# Patient Record
Sex: MALE | Race: WHITE | NOT HISPANIC OR LATINO | Employment: OTHER | URBAN - METROPOLITAN AREA
[De-identification: names, ages, dates, MRNs, and addresses within clinical notes are randomized per-mention and may not be internally consistent; named-entity substitution may affect disease eponyms.]

---

## 2021-04-26 ENCOUNTER — OFFICE VISIT (OUTPATIENT)
Dept: ORTHOPEDICS | Facility: CLINIC | Age: 61
End: 2021-04-26
Payer: COMMERCIAL

## 2021-04-26 ENCOUNTER — TELEPHONE (OUTPATIENT)
Dept: ORTHOPEDICS | Facility: CLINIC | Age: 61
End: 2021-04-26

## 2021-04-26 ENCOUNTER — HOSPITAL ENCOUNTER (OUTPATIENT)
Dept: RADIOLOGY | Facility: HOSPITAL | Age: 61
Discharge: HOME | End: 2021-04-26
Attending: ORTHOPAEDIC SURGERY
Payer: COMMERCIAL

## 2021-04-26 VITALS
WEIGHT: 184.06 LBS | DIASTOLIC BLOOD PRESSURE: 69 MMHG | HEART RATE: 75 BPM | HEIGHT: 65 IN | SYSTOLIC BLOOD PRESSURE: 140 MMHG | BODY MASS INDEX: 30.67 KG/M2

## 2021-04-26 DIAGNOSIS — M25.571 ACUTE RIGHT ANKLE PAIN: Primary | ICD-10-CM

## 2021-04-26 DIAGNOSIS — S82.831A TRAUMATIC CLOSED NONDISPLACED FRACTURE OF DISTAL FIBULA, RIGHT, INITIAL ENCOUNTER: Primary | ICD-10-CM

## 2021-04-26 DIAGNOSIS — M25.571 ACUTE RIGHT ANKLE PAIN: ICD-10-CM

## 2021-04-26 PROCEDURE — 27786 TREATMENT OF ANKLE FRACTURE: CPT | Mod: RT,S$GLB,, | Performed by: ORTHOPAEDIC SURGERY

## 2021-04-26 PROCEDURE — 3008F PR BODY MASS INDEX (BMI) DOCUMENTED: ICD-10-PCS | Mod: CPTII,S$GLB,, | Performed by: ORTHOPAEDIC SURGERY

## 2021-04-26 PROCEDURE — 99999 PR PBB SHADOW E&M-NEW PATIENT-LVL III: CPT | Mod: PBBFAC,,, | Performed by: ORTHOPAEDIC SURGERY

## 2021-04-26 PROCEDURE — 73610 X-RAY EXAM OF ANKLE: CPT | Mod: TC,PO,RT

## 2021-04-26 PROCEDURE — 1125F AMNT PAIN NOTED PAIN PRSNT: CPT | Mod: S$GLB,,, | Performed by: ORTHOPAEDIC SURGERY

## 2021-04-26 PROCEDURE — 73610 X-RAY EXAM OF ANKLE: CPT | Mod: 26,RT,, | Performed by: RADIOLOGY

## 2021-04-26 PROCEDURE — 1125F PR PAIN SEVERITY QUANTIFIED, PAIN PRESENT: ICD-10-PCS | Mod: S$GLB,,, | Performed by: ORTHOPAEDIC SURGERY

## 2021-04-26 PROCEDURE — 27786 PR CLOSED RX DIST FIBULA FX: ICD-10-PCS | Mod: RT,S$GLB,, | Performed by: ORTHOPAEDIC SURGERY

## 2021-04-26 PROCEDURE — 99999 PR PBB SHADOW E&M-NEW PATIENT-LVL III: ICD-10-PCS | Mod: PBBFAC,,, | Performed by: ORTHOPAEDIC SURGERY

## 2021-04-26 PROCEDURE — 99204 OFFICE O/P NEW MOD 45 MIN: CPT | Mod: 57,S$GLB,, | Performed by: ORTHOPAEDIC SURGERY

## 2021-04-26 PROCEDURE — 3008F BODY MASS INDEX DOCD: CPT | Mod: CPTII,S$GLB,, | Performed by: ORTHOPAEDIC SURGERY

## 2021-04-26 PROCEDURE — 99204 PR OFFICE/OUTPT VISIT, NEW, LEVL IV, 45-59 MIN: ICD-10-PCS | Mod: 57,S$GLB,, | Performed by: ORTHOPAEDIC SURGERY

## 2021-04-26 PROCEDURE — 73610 XR ANKLE COMPLETE 3 VIEW RIGHT: ICD-10-PCS | Mod: 26,RT,, | Performed by: RADIOLOGY

## 2021-04-26 RX ORDER — OXCARBAZEPINE 300 MG/1
300 TABLET, FILM COATED ORAL 2 TIMES DAILY
COMMUNITY
Start: 2021-03-23

## 2021-04-26 RX ORDER — TIZANIDINE 4 MG/1
4 TABLET ORAL DAILY
COMMUNITY
Start: 2021-04-07

## 2021-04-26 RX ORDER — LISINOPRIL 20 MG/1
20 TABLET ORAL 2 TIMES DAILY
COMMUNITY
Start: 2021-03-23

## 2021-04-26 RX ORDER — MELOXICAM 15 MG/1
15 TABLET ORAL DAILY
COMMUNITY
Start: 2021-03-23

## 2021-04-26 RX ORDER — ATORVASTATIN CALCIUM 10 MG/1
10 TABLET, FILM COATED ORAL DAILY
COMMUNITY
Start: 2021-03-23

## 2021-04-26 RX ORDER — GABAPENTIN 300 MG/1
CAPSULE ORAL
COMMUNITY
Start: 2021-04-07

## 2021-04-26 RX ORDER — CHLORZOXAZONE 500 MG/1
500 TABLET ORAL 3 TIMES DAILY
COMMUNITY
Start: 2021-03-23

## 2021-04-28 PROBLEM — S82.831A TRAUMATIC CLOSED NONDISPLACED FRACTURE OF DISTAL FIBULA, RIGHT, INITIAL ENCOUNTER: Status: ACTIVE | Noted: 2021-04-28

## 2021-04-29 ENCOUNTER — TELEPHONE (OUTPATIENT)
Dept: ORTHOPEDICS | Facility: CLINIC | Age: 61
End: 2021-04-29

## 2021-04-29 DIAGNOSIS — M25.571 ACUTE RIGHT ANKLE PAIN: Primary | ICD-10-CM

## 2021-04-29 RX ORDER — HYDROCODONE BITARTRATE AND ACETAMINOPHEN 7.5; 325 MG/1; MG/1
1 TABLET ORAL EVERY 6 HOURS PRN
Qty: 24 TABLET | Refills: 0 | Status: SHIPPED | OUTPATIENT
Start: 2021-04-29

## 2021-05-06 DIAGNOSIS — S82.831A TRAUMATIC CLOSED NONDISPLACED FRACTURE OF DISTAL FIBULA, RIGHT, INITIAL ENCOUNTER: ICD-10-CM

## 2021-05-06 DIAGNOSIS — M25.571 ACUTE RIGHT ANKLE PAIN: Primary | ICD-10-CM

## 2021-05-06 DIAGNOSIS — S82.831D TRAUMATIC CLOSED NONDISPLACED FRACTURE OF DISTAL FIBULA, RIGHT, WITH ROUTINE HEALING, SUBSEQUENT ENCOUNTER: ICD-10-CM

## 2021-05-11 ENCOUNTER — HOSPITAL ENCOUNTER (OUTPATIENT)
Dept: RADIOLOGY | Facility: HOSPITAL | Age: 61
Discharge: HOME | End: 2021-05-11
Attending: ORTHOPAEDIC SURGERY
Payer: COMMERCIAL

## 2021-05-11 ENCOUNTER — OFFICE VISIT (OUTPATIENT)
Dept: ORTHOPEDICS | Facility: CLINIC | Age: 61
End: 2021-05-11
Payer: COMMERCIAL

## 2021-05-11 VITALS
BODY MASS INDEX: 30.67 KG/M2 | DIASTOLIC BLOOD PRESSURE: 72 MMHG | HEIGHT: 65 IN | WEIGHT: 184.06 LBS | HEART RATE: 70 BPM | SYSTOLIC BLOOD PRESSURE: 127 MMHG

## 2021-05-11 DIAGNOSIS — S82.831D TRAUMATIC CLOSED NONDISPLACED FRACTURE OF DISTAL FIBULA, RIGHT, WITH ROUTINE HEALING, SUBSEQUENT ENCOUNTER: ICD-10-CM

## 2021-05-11 DIAGNOSIS — S82.831D TRAUMATIC CLOSED NONDISPLACED FRACTURE OF DISTAL FIBULA, RIGHT, WITH ROUTINE HEALING, SUBSEQUENT ENCOUNTER: Primary | ICD-10-CM

## 2021-05-11 PROCEDURE — 73610 XR ANKLE COMPLETE 3 VIEW RIGHT: ICD-10-PCS | Mod: 26,RT,, | Performed by: RADIOLOGY

## 2021-05-11 PROCEDURE — 1125F PR PAIN SEVERITY QUANTIFIED, PAIN PRESENT: ICD-10-PCS | Mod: S$GLB,,, | Performed by: ORTHOPAEDIC SURGERY

## 2021-05-11 PROCEDURE — 3008F BODY MASS INDEX DOCD: CPT | Mod: CPTII,S$GLB,, | Performed by: ORTHOPAEDIC SURGERY

## 2021-05-11 PROCEDURE — 99024 PR POST-OP FOLLOW-UP VISIT: ICD-10-PCS | Mod: S$GLB,,, | Performed by: ORTHOPAEDIC SURGERY

## 2021-05-11 PROCEDURE — 99024 POSTOP FOLLOW-UP VISIT: CPT | Mod: S$GLB,,, | Performed by: ORTHOPAEDIC SURGERY

## 2021-05-11 PROCEDURE — 73610 X-RAY EXAM OF ANKLE: CPT | Mod: 26,RT,, | Performed by: RADIOLOGY

## 2021-05-11 PROCEDURE — 73610 X-RAY EXAM OF ANKLE: CPT | Mod: TC,PO,RT

## 2021-05-11 PROCEDURE — 99999 PR PBB SHADOW E&M-EST. PATIENT-LVL III: CPT | Mod: PBBFAC,,, | Performed by: ORTHOPAEDIC SURGERY

## 2021-05-11 PROCEDURE — 3008F PR BODY MASS INDEX (BMI) DOCUMENTED: ICD-10-PCS | Mod: CPTII,S$GLB,, | Performed by: ORTHOPAEDIC SURGERY

## 2021-05-11 PROCEDURE — 99999 PR PBB SHADOW E&M-EST. PATIENT-LVL III: ICD-10-PCS | Mod: PBBFAC,,, | Performed by: ORTHOPAEDIC SURGERY

## 2021-05-11 PROCEDURE — 1125F AMNT PAIN NOTED PAIN PRSNT: CPT | Mod: S$GLB,,, | Performed by: ORTHOPAEDIC SURGERY

## 2021-05-26 DIAGNOSIS — S82.831D TRAUMATIC CLOSED NONDISPLACED FRACTURE OF DISTAL FIBULA, RIGHT, WITH ROUTINE HEALING, SUBSEQUENT ENCOUNTER: Primary | ICD-10-CM

## 2021-06-01 ENCOUNTER — HOSPITAL ENCOUNTER (OUTPATIENT)
Dept: RADIOLOGY | Facility: HOSPITAL | Age: 61
Discharge: HOME | End: 2021-06-01
Attending: ORTHOPAEDIC SURGERY
Payer: COMMERCIAL

## 2021-06-01 ENCOUNTER — OFFICE VISIT (OUTPATIENT)
Dept: ORTHOPEDICS | Facility: CLINIC | Age: 61
End: 2021-06-01
Payer: COMMERCIAL

## 2021-06-01 VITALS
HEIGHT: 65 IN | HEART RATE: 70 BPM | DIASTOLIC BLOOD PRESSURE: 78 MMHG | WEIGHT: 184.06 LBS | BODY MASS INDEX: 30.67 KG/M2 | SYSTOLIC BLOOD PRESSURE: 140 MMHG

## 2021-06-01 DIAGNOSIS — S82.831D TRAUMATIC CLOSED NONDISPLACED FRACTURE OF DISTAL FIBULA, RIGHT, WITH ROUTINE HEALING, SUBSEQUENT ENCOUNTER: Primary | ICD-10-CM

## 2021-06-01 DIAGNOSIS — S82.831D TRAUMATIC CLOSED NONDISPLACED FRACTURE OF DISTAL FIBULA, RIGHT, WITH ROUTINE HEALING, SUBSEQUENT ENCOUNTER: ICD-10-CM

## 2021-06-01 PROCEDURE — 1125F AMNT PAIN NOTED PAIN PRSNT: CPT | Mod: S$GLB,,, | Performed by: ORTHOPAEDIC SURGERY

## 2021-06-01 PROCEDURE — 99024 PR POST-OP FOLLOW-UP VISIT: ICD-10-PCS | Mod: S$GLB,,, | Performed by: ORTHOPAEDIC SURGERY

## 2021-06-01 PROCEDURE — 1125F PR PAIN SEVERITY QUANTIFIED, PAIN PRESENT: ICD-10-PCS | Mod: S$GLB,,, | Performed by: ORTHOPAEDIC SURGERY

## 2021-06-01 PROCEDURE — 73610 X-RAY EXAM OF ANKLE: CPT | Mod: TC,PO,RT

## 2021-06-01 PROCEDURE — 3008F PR BODY MASS INDEX (BMI) DOCUMENTED: ICD-10-PCS | Mod: CPTII,S$GLB,, | Performed by: ORTHOPAEDIC SURGERY

## 2021-06-01 PROCEDURE — 3008F BODY MASS INDEX DOCD: CPT | Mod: CPTII,S$GLB,, | Performed by: ORTHOPAEDIC SURGERY

## 2021-06-01 PROCEDURE — 73610 XR ANKLE COMPLETE 3 VIEW RIGHT: ICD-10-PCS | Mod: 26,RT,, | Performed by: RADIOLOGY

## 2021-06-01 PROCEDURE — 99999 PR PBB SHADOW E&M-EST. PATIENT-LVL III: CPT | Mod: PBBFAC,,, | Performed by: ORTHOPAEDIC SURGERY

## 2021-06-01 PROCEDURE — 99024 POSTOP FOLLOW-UP VISIT: CPT | Mod: S$GLB,,, | Performed by: ORTHOPAEDIC SURGERY

## 2021-06-01 PROCEDURE — 99999 PR PBB SHADOW E&M-EST. PATIENT-LVL III: ICD-10-PCS | Mod: PBBFAC,,, | Performed by: ORTHOPAEDIC SURGERY

## 2021-06-01 PROCEDURE — 73610 X-RAY EXAM OF ANKLE: CPT | Mod: 26,RT,, | Performed by: RADIOLOGY

## 2021-06-23 DIAGNOSIS — M25.571 ACUTE RIGHT ANKLE PAIN: Primary | ICD-10-CM

## 2021-06-29 ENCOUNTER — OFFICE VISIT (OUTPATIENT)
Dept: ORTHOPEDICS | Facility: CLINIC | Age: 61
End: 2021-06-29
Payer: COMMERCIAL

## 2021-06-29 ENCOUNTER — HOSPITAL ENCOUNTER (OUTPATIENT)
Dept: RADIOLOGY | Facility: HOSPITAL | Age: 61
Discharge: HOME | End: 2021-06-29
Attending: ORTHOPAEDIC SURGERY
Payer: COMMERCIAL

## 2021-06-29 ENCOUNTER — TELEPHONE (OUTPATIENT)
Dept: ORTHOPEDICS | Facility: CLINIC | Age: 61
End: 2021-06-29

## 2021-06-29 VITALS
HEART RATE: 61 BPM | BODY MASS INDEX: 30.67 KG/M2 | WEIGHT: 184.06 LBS | HEIGHT: 65 IN | SYSTOLIC BLOOD PRESSURE: 149 MMHG | DIASTOLIC BLOOD PRESSURE: 72 MMHG

## 2021-06-29 DIAGNOSIS — M25.571 ACUTE RIGHT ANKLE PAIN: ICD-10-CM

## 2021-06-29 DIAGNOSIS — S82.831D TRAUMATIC CLOSED NONDISPLACED FRACTURE OF DISTAL FIBULA, RIGHT, WITH ROUTINE HEALING, SUBSEQUENT ENCOUNTER: Primary | ICD-10-CM

## 2021-06-29 PROCEDURE — 99024 PR POST-OP FOLLOW-UP VISIT: ICD-10-PCS | Mod: S$GLB,,, | Performed by: ORTHOPAEDIC SURGERY

## 2021-06-29 PROCEDURE — 99999 PR PBB SHADOW E&M-EST. PATIENT-LVL III: ICD-10-PCS | Mod: PBBFAC,,, | Performed by: ORTHOPAEDIC SURGERY

## 2021-06-29 PROCEDURE — 3008F PR BODY MASS INDEX (BMI) DOCUMENTED: ICD-10-PCS | Mod: CPTII,S$GLB,, | Performed by: ORTHOPAEDIC SURGERY

## 2021-06-29 PROCEDURE — 1125F PR PAIN SEVERITY QUANTIFIED, PAIN PRESENT: ICD-10-PCS | Mod: S$GLB,,, | Performed by: ORTHOPAEDIC SURGERY

## 2021-06-29 PROCEDURE — 73610 XR ANKLE COMPLETE 3 VIEW RIGHT: ICD-10-PCS | Mod: 26,RT,, | Performed by: RADIOLOGY

## 2021-06-29 PROCEDURE — 73610 X-RAY EXAM OF ANKLE: CPT | Mod: TC,PO,RT

## 2021-06-29 PROCEDURE — 3008F BODY MASS INDEX DOCD: CPT | Mod: CPTII,S$GLB,, | Performed by: ORTHOPAEDIC SURGERY

## 2021-06-29 PROCEDURE — 99024 POSTOP FOLLOW-UP VISIT: CPT | Mod: S$GLB,,, | Performed by: ORTHOPAEDIC SURGERY

## 2021-06-29 PROCEDURE — 99999 PR PBB SHADOW E&M-EST. PATIENT-LVL III: CPT | Mod: PBBFAC,,, | Performed by: ORTHOPAEDIC SURGERY

## 2021-06-29 PROCEDURE — 73610 X-RAY EXAM OF ANKLE: CPT | Mod: 26,RT,, | Performed by: RADIOLOGY

## 2021-06-29 PROCEDURE — 1125F AMNT PAIN NOTED PAIN PRSNT: CPT | Mod: S$GLB,,, | Performed by: ORTHOPAEDIC SURGERY

## 2021-07-01 ENCOUNTER — TELEPHONE (OUTPATIENT)
Dept: ORTHOPEDICS | Facility: CLINIC | Age: 61
End: 2021-07-01

## 2021-07-01 DIAGNOSIS — S82.831D TRAUMATIC CLOSED NONDISPLACED FRACTURE OF DISTAL FIBULA, RIGHT, WITH ROUTINE HEALING, SUBSEQUENT ENCOUNTER: Primary | ICD-10-CM

## 2021-07-19 ENCOUNTER — TELEPHONE (OUTPATIENT)
Dept: ORTHOPEDICS | Facility: CLINIC | Age: 61
End: 2021-07-19

## 2022-01-21 ENCOUNTER — IMMUNIZATION (OUTPATIENT)
Dept: PHARMACY | Facility: CLINIC | Age: 62
End: 2022-01-21
Payer: COMMERCIAL

## 2022-01-21 DIAGNOSIS — Z23 NEED FOR VACCINATION: Primary | ICD-10-CM

## 2022-07-06 ENCOUNTER — OFFICE VISIT (OUTPATIENT)
Dept: URGENT CARE | Facility: CLINIC | Age: 62
End: 2022-07-06
Payer: COMMERCIAL

## 2022-07-06 VITALS
WEIGHT: 167 LBS | SYSTOLIC BLOOD PRESSURE: 131 MMHG | HEIGHT: 65 IN | RESPIRATION RATE: 18 BRPM | HEART RATE: 73 BPM | OXYGEN SATURATION: 99 % | TEMPERATURE: 99 F | BODY MASS INDEX: 27.82 KG/M2 | DIASTOLIC BLOOD PRESSURE: 89 MMHG

## 2022-07-06 DIAGNOSIS — R05.9 COUGH: ICD-10-CM

## 2022-07-06 DIAGNOSIS — U07.1 COVID-19 VIRUS DETECTED: ICD-10-CM

## 2022-07-06 DIAGNOSIS — U07.1 COVID-19: Primary | ICD-10-CM

## 2022-07-06 LAB
CTP QC/QA: YES
SARS-COV-2 RDRP RESP QL NAA+PROBE: POSITIVE

## 2022-07-06 PROCEDURE — 3079F PR MOST RECENT DIASTOLIC BLOOD PRESSURE 80-89 MM HG: ICD-10-PCS | Mod: CPTII,S$GLB,, | Performed by: INTERNAL MEDICINE

## 2022-07-06 PROCEDURE — 3008F PR BODY MASS INDEX (BMI) DOCUMENTED: ICD-10-PCS | Mod: CPTII,S$GLB,, | Performed by: INTERNAL MEDICINE

## 2022-07-06 PROCEDURE — 4010F PR ACE/ARB THEARPY RXD/TAKEN: ICD-10-PCS | Mod: CPTII,S$GLB,, | Performed by: INTERNAL MEDICINE

## 2022-07-06 PROCEDURE — 3079F DIAST BP 80-89 MM HG: CPT | Mod: CPTII,S$GLB,, | Performed by: INTERNAL MEDICINE

## 2022-07-06 PROCEDURE — 1160F RVW MEDS BY RX/DR IN RCRD: CPT | Mod: CPTII,S$GLB,, | Performed by: INTERNAL MEDICINE

## 2022-07-06 PROCEDURE — 99214 OFFICE O/P EST MOD 30 MIN: CPT | Mod: S$GLB,,, | Performed by: INTERNAL MEDICINE

## 2022-07-06 PROCEDURE — 99214 PR OFFICE/OUTPT VISIT, EST, LEVL IV, 30-39 MIN: ICD-10-PCS | Mod: S$GLB,,, | Performed by: INTERNAL MEDICINE

## 2022-07-06 PROCEDURE — 1159F MED LIST DOCD IN RCRD: CPT | Mod: CPTII,S$GLB,, | Performed by: INTERNAL MEDICINE

## 2022-07-06 PROCEDURE — 1159F PR MEDICATION LIST DOCUMENTED IN MEDICAL RECORD: ICD-10-PCS | Mod: CPTII,S$GLB,, | Performed by: INTERNAL MEDICINE

## 2022-07-06 PROCEDURE — 3075F PR MOST RECENT SYSTOLIC BLOOD PRESS GE 130-139MM HG: ICD-10-PCS | Mod: CPTII,S$GLB,, | Performed by: INTERNAL MEDICINE

## 2022-07-06 PROCEDURE — 3008F BODY MASS INDEX DOCD: CPT | Mod: CPTII,S$GLB,, | Performed by: INTERNAL MEDICINE

## 2022-07-06 PROCEDURE — 4010F ACE/ARB THERAPY RXD/TAKEN: CPT | Mod: CPTII,S$GLB,, | Performed by: INTERNAL MEDICINE

## 2022-07-06 PROCEDURE — U0002: ICD-10-PCS | Mod: QW,S$GLB,, | Performed by: INTERNAL MEDICINE

## 2022-07-06 PROCEDURE — U0002 COVID-19 LAB TEST NON-CDC: HCPCS | Mod: QW,S$GLB,, | Performed by: INTERNAL MEDICINE

## 2022-07-06 PROCEDURE — 3075F SYST BP GE 130 - 139MM HG: CPT | Mod: CPTII,S$GLB,, | Performed by: INTERNAL MEDICINE

## 2022-07-06 PROCEDURE — 1160F PR REVIEW ALL MEDS BY PRESCRIBER/CLIN PHARMACIST DOCUMENTED: ICD-10-PCS | Mod: CPTII,S$GLB,, | Performed by: INTERNAL MEDICINE

## 2022-07-06 NOTE — PROGRESS NOTES
"Subjective:       Patient ID: Jacob W Engelbracht is a 62 y.o. male.    Vitals:  height is 5' 5" (1.651 m) and weight is 75.8 kg (167 lb). His temperature is 98.6 °F (37 °C). His blood pressure is 131/89 and his pulse is 73. His respiration is 18 and oxygen saturation is 99%.     Chief Complaint: Cough (Headaches)    Patient presents to the clinic today stating Covid + exposure and having symptoms of  Dry cough, severe headaches, generalized body aches. Temperature yesterday was 101.0 F , OTC Zicam taken. Pt is vaccinated and boosted.     Cough  This is a new problem. The current episode started in the past 7 days. The cough is non-productive. Associated symptoms include chills, headaches, myalgias, nasal congestion and a sore throat. Treatments tried: Zicam  The treatment provided no relief.       Constitution: Positive for chills.   HENT: Positive for sore throat.    Respiratory: Positive for cough.    Musculoskeletal: Positive for muscle ache.   Neurological: Positive for headaches.       Objective:      Physical Exam   Constitutional: He is oriented to person, place, and time. He appears well-developed. He is cooperative.  Non-toxic appearance. He does not appear ill. No distress. normal  HENT:   Head: Normocephalic and atraumatic.   Ears:   Right Ear: Hearing normal.   Left Ear: Hearing normal.   Nose: Nose normal. No mucosal edema or nasal deformity. No epistaxis. Left sinus exhibits no frontal sinus tenderness.   Mouth/Throat: Uvula is midline, oropharynx is clear and moist and mucous membranes are normal.   Eyes: Conjunctivae and lids are normal. No scleral icterus.   Neck: Trachea normal and phonation normal. Neck supple. No edema present. No erythema present. No neck rigidity present.   Cardiovascular: Normal rate, regular rhythm, normal heart sounds and normal pulses.   Pulmonary/Chest: Effort normal and breath sounds normal. No respiratory distress. He has no decreased breath sounds. He has no rhonchi. "   Abdominal: Normal appearance.   Musculoskeletal: Normal range of motion.         General: No deformity. Normal range of motion.   Neurological: He is alert and oriented to person, place, and time. He exhibits normal muscle tone. Coordination normal.   Skin: Skin is warm, dry, intact, not diaphoretic and not pale.   Psychiatric: His speech is normal and behavior is normal. Judgment and thought content normal.   Nursing note and vitals reviewed.        Assessment:       1. Cough          Plan:         Cough  -     POCT COVID-19 Rapid Screening      Patient Instructions   You have tested positive for COVID-19 today.      ISOLATION  If you tested positive and do not have symptoms, you must isolate for 5 days starting on the day of the positive test. I    If you tested positive and have symptoms, you must isolate for 5 days starting on the day of the first symptoms,  not the day of the positive test.     This is the most important part, both the CDC and the [facility] emphasize that you do not test out of isolation.     After 5 days, if your symptoms have improved and you have not had fever on day 5, you can return to the community on day 6- NO TESTING REQUIRED!      In fact, we do not retest if you were positive in the last 90 days.    After your 5 days of isolation are completed, the CDC recommends strict mask use for the first 5 days that you come out of isolation.

## 2022-07-06 NOTE — PATIENT INSTRUCTIONS
HOLD ATORVASTATIN FOR 5 DAYS    You have tested positive for COVID-19 today.      ISOLATION  If you tested positive and do not have symptoms, you must isolate for 5 days starting on the day of the positive test. I    If you tested positive and have symptoms, you must isolate for 5 days starting on the day of the first symptoms,  not the day of the positive test.     This is the most important part, both the CDC and the [facility] emphasize that you do not test out of isolation.     After 5 days, if your symptoms have improved and you have not had fever on day 5, you can return to the community on day 6- NO TESTING REQUIRED!      In fact, we do not retest if you were positive in the last 90 days.    After your 5 days of isolation are completed, the CDC recommends strict mask use for the first 5 days that you come out of isolation.

## 2025-04-10 ENCOUNTER — TELEPHONE (OUTPATIENT)
Dept: NEUROLOGY | Facility: CLINIC | Age: 65
End: 2025-04-10
Payer: MEDICARE

## 2025-04-11 ENCOUNTER — TELEPHONE (OUTPATIENT)
Dept: NEUROLOGY | Facility: CLINIC | Age: 65
End: 2025-04-11
Payer: MEDICARE

## 2025-04-11 NOTE — TELEPHONE ENCOUNTER
Spoke to the pt, appt scheduled on 5/9/25 at 1400 with Mely Grant for seizures.  Date, time and location discussed.

## 2025-04-11 NOTE — TELEPHONE ENCOUNTER
----- Message from Sepideh sent at 4/10/2025  4:39 PM CDT -----  Contact: EVAN, WIFE  Type:  Apoointment RequestName of Caller:EVAN, WIFE When is the first available appointment?N/ASymptoms:MEMORY Would the patient rather a call back or a response via [facility]? CALL Best Call Back Number: [phone] Additional Information: THANK YOU

## 2025-05-09 ENCOUNTER — OFFICE VISIT (OUTPATIENT)
Dept: NEUROLOGY | Facility: CLINIC | Age: 65
End: 2025-05-09
Payer: MEDICARE

## 2025-05-09 VITALS
WEIGHT: 142 LBS | DIASTOLIC BLOOD PRESSURE: 70 MMHG | RESPIRATION RATE: 14 BRPM | SYSTOLIC BLOOD PRESSURE: 160 MMHG | BODY MASS INDEX: 24.84 KG/M2

## 2025-05-09 DIAGNOSIS — G40.909 SEIZURE DISORDER: ICD-10-CM

## 2025-05-09 DIAGNOSIS — R41.3 OTHER AMNESIA: Primary | ICD-10-CM

## 2025-05-09 DIAGNOSIS — R41.3 MEMORY LOSS: ICD-10-CM

## 2025-05-09 DIAGNOSIS — F51.12 INSUFFICIENT SLEEP SYNDROME: ICD-10-CM

## 2025-05-09 PROCEDURE — 99999 PR PBB SHADOW E&M-EST. PATIENT-LVL V: CPT | Mod: PBBFAC,,,

## 2025-05-09 RX ORDER — CHLORZOXAZONE 500 MG/1
500 TABLET ORAL 3 TIMES DAILY
COMMUNITY
Start: 2025-04-02

## 2025-05-09 NOTE — PROGRESS NOTES
NEUROLOGY  Outpatient Consultation Visit   [facility]  [address] [facility] [address], [place] [address]  [phone] (office) / [phone] (fax)    Patient Name:  Jacob W Engelbracht  :  1960  MR #:  [id number]  Acct #:  [id number]    Date of Neurology Consult: 2025  Name of Provider: JAYLIN Tineo    Other Physicians:  Paula Blackburn MD (Primary Care Physician); Palua Blackburn MD (Referring)      Chief Complaint: Memory Loss      History of Present Illness (HPI):  Jacob W Engelbracht is a 65 y.o. male with a PMHX of HTN,  HLD, GERD, and former smoker. Patient presents to clinic for memory evaluation. Patient is here with wife of 17 years who also contributes to the following history.     Patient's highest level of education completed was 12th grade. 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     Wife has noticied forgetting recent conversations. She first noticied this 1 year ago. More trouble with STM. LTM is intact.     He is more irritable than he once was. He denies depression or anxiety. He denies any hallucinations.     Sleep is terrible. He is only able to sleep about 6 hours. He does snore. He has never been tested for MARINA. No RBD symptoms.     No decline in executive function.     He is managing finances. He forgot to pay 2 bills. He is managing medications. No issue with this.     He is independent in ADLs. He had a few occasions of forgetting to take baths.     He denies any trouble with following conversations. He does have some WFD.     He denies any change in vision. No issue with object recognition.     He denies any incontinence.     He denies any changes in gait. He did have a fall, the dog pulled him and he hit his head on the bumper of the car.   He sustained a fall striking his head last summer. He refused to seek any medical care.     He is still driving. Denies getting lost. No recent MVAs.     ? Seizure history.  Isolated seizure, saw Dr. Gray.  He was on Trileptal. He stopped taking this many years ago.   He does take zanaflex nightly. ? Gabapentin.     He lives with constant pain in neck and back.     Significant confusion with medication regimen.     He keeps himself busy working in the yard, taking care of the dog ([employer]).     Mother was diagnosed with dementia.     No personal history of alcoholism. He does drink 3 beers daily. He denies any history of drug use. Former smoker.     Past Medical, Surgical, Family & Social History:   Past Medical History:   Diagnosis Date    Arthritis     Hyperlipidemia     Hypertension     Seizures      Past Surgical History:   Procedure Laterality Date    HERNIA REPAIR       Family History   Problem Relation Name Age of Onset    Alzheimer's disease Mother      Arthritis Father       Alcohol use:  reports current alcohol use of about 15.0 standard drinks of alcohol per week.   (Of note, 0.6 oz = 1 beer or 6 oz = 10 beers).  Tobacco use:  reports that he quit smoking about 15 years ago. His smoking use included cigarettes. He has been exposed to tobacco smoke. He does not have any smokeless tobacco history on file.  Street drug use:  reports that he does not currently use drugs after having used the following drugs: Marijuana.  Allergies: Benadryl [diphenhydramine hcl] and Codeine hcl.    Home Medications:   Current Medications[1]    Physical Examination:  BP (!) 160/70 (BP Location: Right arm, Patient Position: Sitting)   Resp 14   Wt 64.4 kg (141 lb 15.6 oz)   BMI 24.84 kg/m²     Neurological exam:  Mental status: Awake and alert.  Oriented to person, place, time and situation. Recent and remote memory appear to be intact.  Recall 0/3. Fund of knowledge somewhat limited. Decreased attention and concentration. MMSE: 22/30.   Speech/Language: Fluent and appropriate. No dysarthria or aphasia on conversation. Able to follow complex commands.   Cranial nerves (II-XII): Visual  "fields full. Pupils equal round and reactive to light, extraocular movements intact, no ptosis, no nystagmus. Facial sensation and symmetry intact bilaterally. Hearing grossly intact. Palate mobile and midline. Shoulder shrug normal bilaterally. Normal tongue protrusion.   Motor: 5 out of 5 strength throughout the upper and lower extremities bilaterally. Normal bulk and tone. No abnormal movements noted. No drift appreciated.   Sensation: Intact to light touch and vibration bilaterally.    DTR: 2+ at the knees and biceps bilaterally.  Coordination: Finger-nose-finger testing intact bilaterally. JOSETTE normal bilaterally. No tremor.   Gait: Normal gait, including heel, toe and tandem.                Diagnostic Data Reviewed:   I have personally reviewed provider notes, labs and imaging made available to me today.       Labs:  Lab Results   Component Value Date    WBC 5.08 02/23/2024    HGB 13.4 (L) 02/23/2024    HCT 41.3 02/23/2024     02/23/2024     (H) 02/23/2024    RDW 11.9 02/23/2024     Lab Results   Component Value Date     02/23/2024    K 5.1 02/23/2024     02/23/2024    CO2 27 02/23/2024    BUN 24 (H) 02/23/2024    CREATININE 0.84 02/23/2024    GLU 94 02/23/2024    CALCIUM 9.6 02/23/2024    MG 2.1 02/23/2024     Lab Results   Component Value Date    PROT 7.0 02/23/2024    PROT 7.0 02/23/2024    ALBUMIN 4.8 02/23/2024    ALBUMIN 4.8 02/23/2024    BILITOT 0.5 02/23/2024    BILITOT 0.5 02/23/2024    AST 36 02/23/2024    AST 36 02/23/2024    ALKPHOS 72 02/23/2024    ALKPHOS 72 02/23/2024    ALT 32 02/23/2024    ALT 32 02/23/2024     No results found for: "INR", "PROTIME", "PTT"  Lab Results   Component Value Date    CHOL 168 02/23/2024    HDL 70 02/23/2024    LDLCALC 79.0 02/23/2024    TRIG 95 02/23/2024    TRIG 95 02/23/2024    CHOLHDL 41.7 02/23/2024     Lab Results   Component Value Date    HGBA1C 4.9 03/17/2025      Lab Results   Component Value Date    JFNKPZHK18 579 03/17/2025     Lab " "Results   Component Value Date    FOLATE 12.6 03/17/2025     Lab Results   Component Value Date    TSH 0.959 02/23/2024     No results found for: "VITAMINB1"  No results found for: "RPR"  No results found for: "RADU"  No components found for: "HEPATITISCANTIBODY"  No components found for: "HIV 1/2 AG/AB"  No results found for: "CRP"  No components found for: "SEDIMENTATIONRATE"      Assessment and Plan:  Jacob W Engelbracht is a 65 y.o. male here for memory evaluation.       Problem List Items Addressed This Visit       Other amnesia - Primary    Overview   MMSE:     May 2025: 22/30         Current Assessment & Plan   64 y/o male with WFD, STM decline, rapid forgetting. Some decline in instrumental ADLs  -Discussed role of MARINA in cognition. Will obtain HST given difficulty staying asleep.   -Discussed normal aging / MCI / dementia with pt and wife  -Suspect MCI   -Reviewed role of Rx in this setting -- will defer until further testing complete   Discussed vascular RF and importance of continued efforts to maximize vascular health  Medication list reviewed and discussed. Significant confusion. Will send list of accurate medication list via my chart.     Plan:  MRI brain   Serologies to assess for any metabolic factors that may worsen cognition  NP testing to further understand memory loss.             Other Visit Diagnoses         Seizure disorder          Memory loss          Insufficient sleep syndrome                Important to note, also  has a past medical history of Arthritis, Hyperlipidemia, Hypertension, and Seizures.    The patient will return to clinic in 6 months.    Thank you very much for the opportunity to assist in this patient's care.    If you have any questions or concerns, please do not hesitate to contact me at any time.    Sincerely,       JAYLIN Tineo  [facility] [place]     Time Spent: I spent a total of 61 minutes on the day of the visit.This includes face to " face time and non-face to face time preparing to see the patient (eg, review of tests), Obtaining and/or reviewing separately obtained history, Documenting clinical information in the electronic or other health record, Independently interpreting resultsand communicating results to the patient/family/caregiver, or Care coordination.           [1]   Current Outpatient Medications:     atorvastatin (LIPITOR) 10 MG tablet, Take 10 mg by mouth once daily., Disp: , Rfl:     chlorthalidone (HYGROTEN) 25 MG Tab, Take 1 tablet (25 mg total) by mouth once daily., Disp: 30 tablet, Rfl: 2    chlorzoxazone (PARAFON FORTE) 500 mg Tab, Take 500 mg by mouth 3 (three) times daily., Disp: , Rfl:     gabapentin (NEURONTIN) 300 MG capsule, TAKE 1 CAPSULE BY MOUTH ONCE DAILY AT BEDTIME, Disp: , Rfl:     lisinopriL (PRINIVIL,ZESTRIL) 40 MG tablet, Take 40 mg by mouth every morning., Disp: , Rfl:     meloxicam (MOBIC) 15 MG tablet, Take 15 mg by mouth once daily., Disp: , Rfl:     metoprolol succinate (TOPROL-XL) 25 MG 24 hr tablet, Take 25 mg by mouth once daily., Disp: , Rfl:     OXcarbazepine (TRILEPTAL) 300 MG Tab, Take 1 tablet (300 mg total) by mouth 2 (two) times daily., Disp: , Rfl:     tiZANidine (ZANAFLEX) 4 MG tablet, Take 4 mg by mouth once daily., Disp: , Rfl:

## 2025-05-09 NOTE — ASSESSMENT & PLAN NOTE
66 y/o male with WFD, STM decline, rapid forgetting. Some decline in instrumental ADLs  -Discussed role of MARINA in cognition. Will obtain HST given difficulty staying asleep.   -Discussed normal aging / MCI / dementia with pt and wife  -Suspect MCI   -Reviewed role of Rx in this setting -- will defer until further testing complete   Discussed vascular RF and importance of continued efforts to maximize vascular health  Medication list reviewed and discussed. Significant confusion. Will send list of accurate medication list via my chart.     Plan:  MRI brain   Serologies to assess for any metabolic factors that may worsen cognition  NP testing to further understand memory loss.

## 2025-05-23 ENCOUNTER — HOSPITAL ENCOUNTER (OUTPATIENT)
Dept: RADIOLOGY | Facility: HOSPITAL | Age: 65
Discharge: HOME | End: 2025-05-23
Payer: MEDICARE

## 2025-05-23 DIAGNOSIS — R41.3 OTHER AMNESIA: ICD-10-CM

## 2025-05-23 PROCEDURE — 70551 MRI BRAIN STEM W/O DYE: CPT | Mod: TC,PO

## 2025-05-23 PROCEDURE — 70551 MRI BRAIN STEM W/O DYE: CPT | Mod: 26,,, | Performed by: RADIOLOGY

## 2025-06-10 ENCOUNTER — TELEPHONE (OUTPATIENT)
Dept: NEUROLOGY | Facility: CLINIC | Age: 65
End: 2025-06-10
Payer: MEDICARE

## 2025-06-10 DIAGNOSIS — G47.33 OSA (OBSTRUCTIVE SLEEP APNEA): Primary | ICD-10-CM

## 2025-06-10 NOTE — TELEPHONE ENCOUNTER
"Spoke with Pt wife who stated she VU but does not understand how this is signs of the normal aging when Pt wife is the same age as the Pt and he is "a lot worse than me"  Please advise  "

## 2025-06-10 NOTE — TELEPHONE ENCOUNTER
Sent encounter to provider is separate encounter      Copied from CRM #[id number]. Topic: General Inquiry - Patient Advice  >> Lg 10, 2025 12:31 PM Suzanne wrote:  Type:  Test Results    Who Called:  Pt Wife Simran  Name of Test (Lab/Mammo/Etc):  MRI  Date of Test:  5/23  Ordering Provider: Dr. Grant  Where the test was performed:  [facility]  Would the patient rather a call back or a response via [facility]? Call   Best Call Back Number:  [phone] / Pt Wife is requesting a call around 3:30 pm when she get home so both she and her  can listen to the conversation. Please call to advise... Thank you...

## 2025-06-10 NOTE — TELEPHONE ENCOUNTER
Pt would like to discuss MRI results after 3:30 today  Would you like me to assist Pt with scheduling VV to discuss   Please advise

## 2025-06-11 ENCOUNTER — TELEPHONE (OUTPATIENT)
Dept: NEUROLOGY | Facility: CLINIC | Age: 65
End: 2025-06-11
Payer: MEDICARE

## 2025-06-11 NOTE — TELEPHONE ENCOUNTER
Duplicate encounter, spoke with Pt wife    Copied from CRM #[id number]. Topic: General Inquiry - Return Call  >> Jun 11, 2025 10:08 AM Areli wrote:  Type:  Patient Returning Call    Who Called: Silvestre  Who Left Message for Patient: Nurse  Does the patient know what this is regarding?: No  Would the patient rather a call back or a response via [facility]? callback  Best Call Back Number: [phone]  Additional Information:

## 2025-06-11 NOTE — TELEPHONE ENCOUNTER
Spoke with Pt wife and further discussed [facility] notes   Pt wife stated she didn't know a referral for NP testing was placed, advised looks like it was placed at [facility], the wait list is typically lengthy but once NP testing is complete we will have a better understanding of what Pt might be experiencing with his memory   Wife VU  Wife asked question regarding equipment Pt is suppose to received from being dx with MARINA, advised I unfortunately do not know anything in regards to equipment but gave wife phone number to office that conducted sleep study to give wife more info on how to get equipment Pt needs

## 2025-06-30 ENCOUNTER — TELEPHONE (OUTPATIENT)
Dept: NEUROLOGY | Facility: CLINIC | Age: 65
End: 2025-06-30
Payer: MEDICARE

## 2025-06-30 NOTE — TELEPHONE ENCOUNTER
Pt LOV was 5/9 and wife called upset stating something isnt right and he's forgetting a lot and doing abnormal things  Please advise        Copied from CRM #[id number]. Topic: General Inquiry - Patient Advice  >> Jun 30, 2025  8:38 AM Vanessa wrote:  Type:  Needs Medical Advice    Who Called: wife  Symptoms (please be specific): forgetting things   How long has patient had these symptoms:  n/a    Would the patient rather a call back or a response via [facility]? call  Best Call Back Number: [phone]  Additional Information: wife very upset and concerned that something isn't right with . He is forgetting a lot and doing abnormal things. Please call

## 2025-06-30 NOTE — TELEPHONE ENCOUNTER
Spoke with Pt wife and stated she has not discussed with PCP, she will get with PCP to rule out UTI, advised to call us once she knows when they can get on schedule with neuro after seeing PCP to rule out something like UTI